# Patient Record
Sex: FEMALE | Race: WHITE | Employment: UNEMPLOYED | URBAN - METROPOLITAN AREA
[De-identification: names, ages, dates, MRNs, and addresses within clinical notes are randomized per-mention and may not be internally consistent; named-entity substitution may affect disease eponyms.]

---

## 2018-12-20 ENCOUNTER — HOSPITAL ENCOUNTER (OUTPATIENT)
Age: 43
Setting detail: SPECIMEN
Discharge: HOME | End: 2018-12-20
Payer: COMMERCIAL

## 2018-12-24 LAB — DERMATOLOGY PATHOLOGY REPORT: NORMAL

## 2019-11-13 ENCOUNTER — HOSPITAL ENCOUNTER (OUTPATIENT)
Dept: PHYSICAL THERAPY | Facility: CLINIC | Age: 44
Setting detail: THERAPIES SERIES
Discharge: HOME | End: 2019-11-13
Payer: COMMERCIAL

## 2019-11-13 PROCEDURE — 97110 THERAPEUTIC EXERCISES: CPT

## 2019-11-13 PROCEDURE — 97162 PT EVAL MOD COMPLEX 30 MIN: CPT

## 2021-01-16 ENCOUNTER — APPOINTMENT (OUTPATIENT)
Dept: GENERAL RADIOLOGY | Age: 46
DRG: 177 | End: 2021-01-16
Payer: COMMERCIAL

## 2021-01-16 ENCOUNTER — HOSPITAL ENCOUNTER (INPATIENT)
Age: 46
LOS: 1 days | Discharge: HOME | DRG: 177 | End: 2021-01-17
Attending: EMERGENCY MEDICINE | Admitting: HOSPITALIST
Payer: COMMERCIAL

## 2021-01-16 ENCOUNTER — APPOINTMENT (OUTPATIENT)
Dept: CT IMAGING | Age: 46
DRG: 177 | End: 2021-01-16
Payer: COMMERCIAL

## 2021-01-16 DIAGNOSIS — U07.1 COVID-19: Primary | ICD-10-CM

## 2021-01-16 DIAGNOSIS — R00.0 TACHYCARDIA: ICD-10-CM

## 2021-01-16 LAB
ABSOLUTE EOS #: 0 K/UL (ref 0–0.44)
ABSOLUTE IMMATURE GRANULOCYTE: 0.06 K/UL (ref 0–0.3)
ABSOLUTE LYMPH #: 0.58 K/UL (ref 1.1–3.7)
ABSOLUTE MONO #: 0.13 K/UL (ref 0.1–1.2)
ALBUMIN SERPL-MCNC: 3.8 G/DL (ref 3.5–5.2)
ALBUMIN/GLOBULIN RATIO: ABNORMAL (ref 1–2.5)
ALP BLD-CCNC: 84 U/L (ref 35–104)
ALT SERPL-CCNC: 35 U/L (ref 5–33)
ANION GAP SERPL CALCULATED.3IONS-SCNC: 14 MMOL/L (ref 9–17)
AST SERPL-CCNC: 42 U/L
BASOPHILS # BLD: 1 % (ref 0–2)
BASOPHILS ABSOLUTE: 0.06 K/UL (ref 0–0.2)
BILIRUB SERPL-MCNC: 0.21 MG/DL (ref 0.3–1.2)
BNP INTERPRETATION: NORMAL
BUN BLDV-MCNC: 14 MG/DL (ref 6–20)
BUN/CREAT BLD: 18 (ref 9–20)
C-REACTIVE PROTEIN: 39.2 MG/L (ref 0–5)
CALCIUM SERPL-MCNC: 8.8 MG/DL (ref 8.6–10.4)
CHLORIDE BLD-SCNC: 106 MMOL/L (ref 98–107)
CO2: 14 MMOL/L (ref 20–31)
CREAT SERPL-MCNC: 0.76 MG/DL (ref 0.5–0.9)
D-DIMER QUANTITATIVE: 2.07 MG/L FEU (ref 0–0.59)
DIFFERENTIAL TYPE: ABNORMAL
EKG ATRIAL RATE: 150 BPM
EKG P AXIS: 10 DEGREES
EKG P-R INTERVAL: 114 MS
EKG Q-T INTERVAL: 276 MS
EKG QRS DURATION: 90 MS
EKG QTC CALCULATION (BAZETT): 436 MS
EKG R AXIS: -127 DEGREES
EKG T AXIS: 12 DEGREES
EKG VENTRICULAR RATE: 150 BPM
EOSINOPHILS RELATIVE PERCENT: 0 % (ref 1–4)
FERRITIN: 175 UG/L (ref 13–150)
FIBRINOGEN: 461 MG/DL (ref 179–518)
GFR AFRICAN AMERICAN: >60 ML/MIN
GFR NON-AFRICAN AMERICAN: >60 ML/MIN
GFR SERPL CREATININE-BSD FRML MDRD: ABNORMAL ML/MIN/{1.73_M2}
GFR SERPL CREATININE-BSD FRML MDRD: ABNORMAL ML/MIN/{1.73_M2}
GLUCOSE BLD-MCNC: 169 MG/DL (ref 70–99)
HCT VFR BLD CALC: 41.2 % (ref 36.3–47.1)
HEMOGLOBIN: 13.3 G/DL (ref 11.9–15.1)
IMMATURE GRANULOCYTES: 1 %
LACTATE DEHYDROGENASE: 251 U/L (ref 135–214)
LYMPHOCYTES # BLD: 9 % (ref 24–43)
MAGNESIUM: 2.1 MG/DL (ref 1.6–2.6)
MCH RBC QN AUTO: 30.4 PG (ref 25.2–33.5)
MCHC RBC AUTO-ENTMCNC: 32.3 G/DL (ref 28.4–34.8)
MCV RBC AUTO: 94.1 FL (ref 82.6–102.9)
MONOCYTES # BLD: 2 % (ref 3–12)
NRBC AUTOMATED: 0 PER 100 WBC
PDW BLD-RTO: 12.3 % (ref 11.8–14.4)
PLATELET # BLD: 178 K/UL (ref 138–453)
PLATELET ESTIMATE: ABNORMAL
PMV BLD AUTO: 9.7 FL (ref 8.1–13.5)
POTASSIUM SERPL-SCNC: 3.7 MMOL/L (ref 3.7–5.3)
PRO-BNP: 29 PG/ML
RBC # BLD: 4.38 M/UL (ref 3.95–5.11)
RBC # BLD: ABNORMAL 10*6/UL
SARS-COV-2, RAPID: DETECTED
SARS-COV-2: ABNORMAL
SARS-COV-2: ABNORMAL
SEG NEUTROPHILS: 87 % (ref 36–65)
SEGMENTED NEUTROPHILS ABSOLUTE COUNT: 5.57 K/UL (ref 1.5–8.1)
SODIUM BLD-SCNC: 134 MMOL/L (ref 135–144)
SOURCE: ABNORMAL
TOTAL PROTEIN: 6.7 G/DL (ref 6.4–8.3)
TROPONIN INTERP: NORMAL
TROPONIN T: NORMAL NG/ML
TROPONIN, HIGH SENSITIVITY: 10 NG/L (ref 0–14)
TROPONIN, HIGH SENSITIVITY: 10 NG/L (ref 0–14)
TROPONIN, HIGH SENSITIVITY: 14 NG/L (ref 0–14)
WBC # BLD: 6.4 K/UL (ref 3.5–11.3)
WBC # BLD: ABNORMAL 10*3/UL

## 2021-01-16 PROCEDURE — 94761 N-INVAS EAR/PLS OXIMETRY MLT: CPT

## 2021-01-16 PROCEDURE — 84484 ASSAY OF TROPONIN QUANT: CPT

## 2021-01-16 PROCEDURE — 86140 C-REACTIVE PROTEIN: CPT

## 2021-01-16 PROCEDURE — 6360000004 HC RX CONTRAST MEDICATION: Performed by: EMERGENCY MEDICINE

## 2021-01-16 PROCEDURE — 96375 TX/PRO/DX INJ NEW DRUG ADDON: CPT

## 2021-01-16 PROCEDURE — 96374 THER/PROPH/DIAG INJ IV PUSH: CPT

## 2021-01-16 PROCEDURE — 85379 FIBRIN DEGRADATION QUANT: CPT

## 2021-01-16 PROCEDURE — 6360000002 HC RX W HCPCS: Performed by: EMERGENCY MEDICINE

## 2021-01-16 PROCEDURE — 71045 X-RAY EXAM CHEST 1 VIEW: CPT

## 2021-01-16 PROCEDURE — 2580000003 HC RX 258: Performed by: HOSPITALIST

## 2021-01-16 PROCEDURE — 93005 ELECTROCARDIOGRAM TRACING: CPT | Performed by: EMERGENCY MEDICINE

## 2021-01-16 PROCEDURE — 85025 COMPLETE CBC W/AUTO DIFF WBC: CPT

## 2021-01-16 PROCEDURE — 93010 ELECTROCARDIOGRAM REPORT: CPT | Performed by: INTERNAL MEDICINE

## 2021-01-16 PROCEDURE — 96361 HYDRATE IV INFUSION ADD-ON: CPT

## 2021-01-16 PROCEDURE — 6370000000 HC RX 637 (ALT 250 FOR IP): Performed by: HOSPITALIST

## 2021-01-16 PROCEDURE — 2500000003 HC RX 250 WO HCPCS: Performed by: EMERGENCY MEDICINE

## 2021-01-16 PROCEDURE — 83735 ASSAY OF MAGNESIUM: CPT

## 2021-01-16 PROCEDURE — 83615 LACTATE (LD) (LDH) ENZYME: CPT

## 2021-01-16 PROCEDURE — 80053 COMPREHEN METABOLIC PANEL: CPT

## 2021-01-16 PROCEDURE — 71260 CT THORAX DX C+: CPT

## 2021-01-16 PROCEDURE — 82728 ASSAY OF FERRITIN: CPT

## 2021-01-16 PROCEDURE — 99254 IP/OBS CNSLTJ NEW/EST MOD 60: CPT | Performed by: INTERNAL MEDICINE

## 2021-01-16 PROCEDURE — 36415 COLL VENOUS BLD VENIPUNCTURE: CPT

## 2021-01-16 PROCEDURE — 6360000002 HC RX W HCPCS: Performed by: HOSPITALIST

## 2021-01-16 PROCEDURE — 85384 FIBRINOGEN ACTIVITY: CPT

## 2021-01-16 PROCEDURE — 99284 EMERGENCY DEPT VISIT MOD MDM: CPT

## 2021-01-16 PROCEDURE — 2060000000 HC ICU INTERMEDIATE R&B

## 2021-01-16 PROCEDURE — 2580000003 HC RX 258: Performed by: EMERGENCY MEDICINE

## 2021-01-16 PROCEDURE — 83880 ASSAY OF NATRIURETIC PEPTIDE: CPT

## 2021-01-16 PROCEDURE — U0002 COVID-19 LAB TEST NON-CDC: HCPCS

## 2021-01-16 RX ORDER — DEXAMETHASONE 4 MG/1
6 TABLET ORAL DAILY
Status: DISCONTINUED | OUTPATIENT
Start: 2021-01-16 | End: 2021-01-17 | Stop reason: HOSPADM

## 2021-01-16 RX ORDER — MORPHINE SULFATE 15 MG/1
15 TABLET, FILM COATED, EXTENDED RELEASE ORAL 3 TIMES DAILY
COMMUNITY

## 2021-01-16 RX ORDER — QUETIAPINE FUMARATE 50 MG/1
50 TABLET, FILM COATED ORAL EVERY MORNING
COMMUNITY

## 2021-01-16 RX ORDER — ACETAMINOPHEN 325 MG/1
650 TABLET ORAL EVERY 4 HOURS PRN
Status: DISCONTINUED | OUTPATIENT
Start: 2021-01-16 | End: 2021-01-17 | Stop reason: HOSPADM

## 2021-01-16 RX ORDER — 0.9 % SODIUM CHLORIDE 0.9 %
80 INTRAVENOUS SOLUTION INTRAVENOUS ONCE
Status: COMPLETED | OUTPATIENT
Start: 2021-01-16 | End: 2021-01-16

## 2021-01-16 RX ORDER — TRAMADOL HYDROCHLORIDE 50 MG/1
50-100 TABLET ORAL 2 TIMES DAILY PRN
COMMUNITY

## 2021-01-16 RX ORDER — POTASSIUM CHLORIDE 7.45 MG/ML
10 INJECTION INTRAVENOUS PRN
Status: DISCONTINUED | OUTPATIENT
Start: 2021-01-16 | End: 2021-01-17 | Stop reason: HOSPADM

## 2021-01-16 RX ORDER — SODIUM CHLORIDE 0.9 % (FLUSH) 0.9 %
10 SYRINGE (ML) INJECTION PRN
Status: DISCONTINUED | OUTPATIENT
Start: 2021-01-16 | End: 2021-01-17 | Stop reason: HOSPADM

## 2021-01-16 RX ORDER — MAGNESIUM SULFATE 1 G/100ML
1 INJECTION INTRAVENOUS PRN
Status: DISCONTINUED | OUTPATIENT
Start: 2021-01-16 | End: 2021-01-17 | Stop reason: HOSPADM

## 2021-01-16 RX ORDER — MELOXICAM 15 MG/1
15 TABLET ORAL DAILY
COMMUNITY

## 2021-01-16 RX ORDER — QUETIAPINE FUMARATE 25 MG/1
50 TABLET, FILM COATED ORAL EVERY MORNING
Status: DISCONTINUED | OUTPATIENT
Start: 2021-01-17 | End: 2021-01-17 | Stop reason: HOSPADM

## 2021-01-16 RX ORDER — SODIUM CHLORIDE 0.9 % (FLUSH) 0.9 %
10 SYRINGE (ML) INJECTION EVERY 12 HOURS SCHEDULED
Status: DISCONTINUED | OUTPATIENT
Start: 2021-01-16 | End: 2021-01-17 | Stop reason: HOSPADM

## 2021-01-16 RX ORDER — GABAPENTIN 300 MG/1
300 CAPSULE ORAL 2 TIMES DAILY
COMMUNITY

## 2021-01-16 RX ORDER — DEXAMETHASONE SODIUM PHOSPHATE 10 MG/ML
6 INJECTION, SOLUTION INTRAMUSCULAR; INTRAVENOUS ONCE
Status: COMPLETED | OUTPATIENT
Start: 2021-01-16 | End: 2021-01-16

## 2021-01-16 RX ORDER — ROSUVASTATIN CALCIUM 5 MG/1
5 TABLET, COATED ORAL DAILY
COMMUNITY

## 2021-01-16 RX ORDER — POTASSIUM CHLORIDE 20 MEQ/1
40 TABLET, EXTENDED RELEASE ORAL PRN
Status: DISCONTINUED | OUTPATIENT
Start: 2021-01-16 | End: 2021-01-17 | Stop reason: HOSPADM

## 2021-01-16 RX ORDER — LABETALOL HYDROCHLORIDE 5 MG/ML
10 INJECTION, SOLUTION INTRAVENOUS ONCE
Status: COMPLETED | OUTPATIENT
Start: 2021-01-16 | End: 2021-01-16

## 2021-01-16 RX ORDER — FAMOTIDINE 40 MG/1
40 TABLET, FILM COATED ORAL NIGHTLY
COMMUNITY

## 2021-01-16 RX ORDER — QUETIAPINE FUMARATE 200 MG/1
400 TABLET, FILM COATED ORAL NIGHTLY
Status: DISCONTINUED | OUTPATIENT
Start: 2021-01-16 | End: 2021-01-17 | Stop reason: HOSPADM

## 2021-01-16 RX ORDER — GABAPENTIN 300 MG/1
600 CAPSULE ORAL NIGHTLY
COMMUNITY

## 2021-01-16 RX ORDER — GABAPENTIN 300 MG/1
300 CAPSULE ORAL 4 TIMES DAILY PRN
Status: DISCONTINUED | OUTPATIENT
Start: 2021-01-16 | End: 2021-01-17 | Stop reason: HOSPADM

## 2021-01-16 RX ORDER — LEVOTHYROXINE SODIUM 0.03 MG/1
25 TABLET ORAL
COMMUNITY

## 2021-01-16 RX ORDER — ALBUTEROL SULFATE 90 UG/1
2 AEROSOL, METERED RESPIRATORY (INHALATION) EVERY 6 HOURS PRN
Status: DISCONTINUED | OUTPATIENT
Start: 2021-01-16 | End: 2021-01-17 | Stop reason: HOSPADM

## 2021-01-16 RX ORDER — 0.9 % SODIUM CHLORIDE 0.9 %
1000 INTRAVENOUS SOLUTION INTRAVENOUS ONCE
Status: COMPLETED | OUTPATIENT
Start: 2021-01-16 | End: 2021-01-16

## 2021-01-16 RX ORDER — ATOMOXETINE 60 MG/1
60 CAPSULE ORAL DAILY
COMMUNITY

## 2021-01-16 RX ORDER — TOPIRAMATE 25 MG/1
25-50 TABLET ORAL SEE ADMIN INSTRUCTIONS
COMMUNITY
Start: 2021-01-03

## 2021-01-16 RX ORDER — BUSPIRONE HYDROCHLORIDE 7.5 MG/1
7.5 TABLET ORAL 2 TIMES DAILY
COMMUNITY

## 2021-01-16 RX ORDER — MORPHINE SULFATE 15 MG/1
15 TABLET, FILM COATED, EXTENDED RELEASE ORAL 3 TIMES DAILY
Status: DISCONTINUED | OUTPATIENT
Start: 2021-01-16 | End: 2021-01-17 | Stop reason: HOSPADM

## 2021-01-16 RX ADMIN — IOPAMIDOL 75 ML: 755 INJECTION, SOLUTION INTRAVENOUS at 06:13

## 2021-01-16 RX ADMIN — ENOXAPARIN SODIUM 30 MG: 30 INJECTION SUBCUTANEOUS at 15:06

## 2021-01-16 RX ADMIN — GABAPENTIN 300 MG: 300 CAPSULE ORAL at 17:27

## 2021-01-16 RX ADMIN — QUETIAPINE FUMARATE 400 MG: 200 TABLET ORAL at 20:17

## 2021-01-16 RX ADMIN — Medication 10 ML: at 20:18

## 2021-01-16 RX ADMIN — DEXAMETHASONE 6 MG: 4 TABLET ORAL at 15:06

## 2021-01-16 RX ADMIN — Medication 10 ML: at 06:13

## 2021-01-16 RX ADMIN — MORPHINE SULFATE 15 MG: 15 TABLET, FILM COATED, EXTENDED RELEASE ORAL at 20:17

## 2021-01-16 RX ADMIN — LABETALOL HYDROCHLORIDE 10 MG: 5 INJECTION INTRAVENOUS at 04:40

## 2021-01-16 RX ADMIN — MORPHINE SULFATE 15 MG: 15 TABLET, FILM COATED, EXTENDED RELEASE ORAL at 15:09

## 2021-01-16 RX ADMIN — SODIUM CHLORIDE 80 ML: 9 INJECTION, SOLUTION INTRAVENOUS at 06:13

## 2021-01-16 RX ADMIN — DEXAMETHASONE SODIUM PHOSPHATE 6 MG: 10 INJECTION, SOLUTION INTRAMUSCULAR; INTRAVENOUS at 05:50

## 2021-01-16 RX ADMIN — ENOXAPARIN SODIUM 30 MG: 30 INJECTION SUBCUTANEOUS at 20:17

## 2021-01-16 RX ADMIN — SODIUM CHLORIDE 1000 ML: 9 INJECTION, SOLUTION INTRAVENOUS at 04:41

## 2021-01-16 ASSESSMENT — PAIN SCALES - GENERAL
PAINLEVEL_OUTOF10: 8
PAINLEVEL_OUTOF10: 0

## 2021-01-16 NOTE — H&P
History & Physical  [facility]    Adult Hospitalist      Name: Ginny Travis  MRN: [id number]     Acct: [de-identified]  Room: [facility]2/1012-01    Admit Date: 1/16/2021  2:39 AM  PCP: Dyana Comer MD    Primary Problem  Active Problems:    COVID-19  Resolved Problems:    * No resolved hospital problems. *        Assesment:     · COVID-19 infection  · Viral pneumonia secondary to above  · Acute respiratory insufficiency  · Elevated D-dimer  · Hyponatremia  · Sinus tachycardia  · Depression with anxiety  · Chronic low back pain        Plan:     · Admitted to intermediate level  · O2 maintain oxygen saturation greater 92%  · Supportive care/prone positioning  · Decadron for now  · Monitor D-dimer  · Serial troponin  · Monitor sodium  · ID consult  · Pulmonology consult  · Continue to monitor/telemetry/CBC with differential daily/BMP daily  · DVT and GI prophylaxis. Continue medications as below      Scheduled Meds:   sodium chloride flush  10 mL Intravenous 2 times per day    morphine  15 mg Oral TID    QUEtiapine  300 mg Oral Daily     Continuous Infusions:    PRN Meds:      sodium chloride flush, 10 mL, PRN      sodium chloride flush, 10 mL, PRN      acetaminophen, 650 mg, Q4H PRN      gabapentin, 300 mg, 4x Daily PRN        Chief Complaint:     Chief Complaint   Patient presents with    Shortness of Breath         History of Present Illness:      Ginny Travis is a 50 y.o.  female who presents with Shortness of Breath    64-year-old lady with past medical history of chronic back pain, depression with anxiety presented to ER complaining of shortness of breath. Patient stated that her  was diagnosed of COVID-19 infection and has had symptoms since 1/11/2021. Patient stated that she developed difficulty breathing, lightheadedness, subjective fever, chills and lightheadedness. She denies any episode of passing out. Patient presented to ER due to above-mentioned symptoms.   On presentation she was desaturating and required 3 L O2 via nasal cannula. Patient also was tachycardic with heart rate in 130-140. On presentation WBC was 6.4. Sodium was 134. LDH was 251. Ferritin was 175. D-dimer was 2.07. Rapid Covid testing was positive. Chest x-ray was negative. CTA chest was negative for PE, shows bibasilar atelectasis and mild hiatal hernia. I have personally reviewed the past medical history, past surgical history, medications, social history, and family history, and summarized in the note. Review of Systems:     All 10 point system is reviewed and negative otherwise mentioned in HPI. Past Medical History:     Past Medical History:   Diagnosis Date    Back pain     Depression         Past Surgical History:     Past Surgical History:   Procedure Laterality Date    CHOLECYSTECTOMY          Medications Prior to Admission:       Prior to Admission medications    Medication Sig Start Date End Date Taking? Authorizing Provider   gabapentin (NEURONTIN) 300 MG capsule Take 300 mg by mouth 4 times daily as needed. Historical Provider, MD   morphine (MSIR) 15 MG tablet Take 15 mg by mouth 3 times daily. Historical Provider, MD   QUEtiapine (SEROQUEL) 300 MG tablet Take 300 mg by mouth daily    Historical Provider, MD        Allergies: Wellbutrin [bupropion]    Social History:     Tobacco:    reports that she has never smoked. She does not have any smokeless tobacco history on file. Alcohol:      reports no history of alcohol use. Drug Use:  reports no history of drug use. Family History:     History reviewed. No pertinent family history.       Physical Exam:     Vitals:  BP (!) 151/83   Pulse 103   Temp 98.2 °F (36.8 °C) (Oral)   Resp 16   Ht 5' 7\" (1.702 m)   Wt 251 lb 9.6 oz (114.1 kg)   SpO2 96%   BMI 39.41 kg/m²   Temp (24hrs), Av.1 °F (36.7 °C), Min:97.3 °F (36.3 °C), Max:98.5 °F (36.9 °C)          General appearance - alert, well appearing, and in no acute distress  Mental status - oriented to person, place, and time with normal affect  Head - normocephalic and atraumatic  Eyes - pupils equal and reactive, extraocular eye movements intact, conjunctiva clear  Ears - hearing appears to be intact  Nose - no drainage noted  Mouth - mucous membranes moist  Neck - supple, no carotid bruits, thyroid not palpable  Chest - clear to auscultation, normal effort  Heart - normal rate, regular rhythm, no murmur  Abdomen - soft, nontender, nondistended, bowel sounds present all four quadrants, no masses, hepatomegaly or splenomegaly  Neurological - normal speech, no focal findings or movement disorder noted, cranial nerves II through XII grossly intact  Extremities - peripheral pulses palpable, no pedal edema or calf pain with palpation  Skin - no gross lesions, rashes, or induration noted        Data:     Labs:    Hematology:  Recent Labs     01/16/21 0238 01/16/21  1040   WBC 6.4  --    RBC 4.38  --    HGB 13.3  --    HCT 41.2  --    MCV 94.1  --    MCH 30.4  --    MCHC 32.3  --    RDW 12.3  --      --    MPV 9.7  --    DDIMER  --  2.07*     Chemistry:  Recent Labs     01/16/21 0238   *   K 3.7      CO2 14*   GLUCOSE 169*   BUN 14   CREATININE 0.76   ANIONGAP 14   LABGLOM >60   GFRAA >60   CALCIUM 8.8   PROBNP 29   TROPHS 14     Recent Labs     01/16/21 0238 01/16/21  1040   PROT 6.7  --    LABALBU 3.8  --    AST 42*  --    ALT 35*  --    LDH  --  251*   ALKPHOS 84  --    BILITOT 0.21*  --        No results found for: INR, PROTIME    Lab Results   Component Value Date/Time    SPECIAL NOT REPORTED 04/17/2013 09:38 PM     Lab Results   Component Value Date/Time    CULTURE (A) 04/17/2013 09:38 PM     STAPHYLOCOCCUS AUREUS MODERATE GROWTH This isolate is methicillin susceptible.     CULTURE NORMAL SKIN FAIZA (A) 04/17/2013 09:38 PM    CULTURE  04/17/2013 09:38 PM     [employer] [address] [address] [phone]       No results found for: POCPH, PHART, PH, POCPCO2, VBW8SAR, PCO2, POCPO2, PO2ART, PO2, POCHCO3, VTM8CRU, HCO3, NBEA, PBEA, BEART, BE, THGBART, THB, ICJ1LSC, KYSN7XBF, I5CLMTYO, O2SAT, FIO2    Radiology:    Xr Chest Portable    Result Date: 1/16/2021  No acute cardiopulmonary process. Ct Chest Pulmonary Embolism W Contrast    Result Date: 1/16/2021  Bibasilar atelectasis. No acute or chronic pulmonary embolism. Mild hiatal hernia. All radiological studies reviewed                Code Status:  Full Code    Electronically signed by Callie Stephens MD on 1/16/2021 at 1:52 PM     Copy sent to Dr. Mesfin Richey MD    This note was created with the assistance of a speech-recognition program.  Although the intention is to generate a document that actually reflects the content of the visit, no guarantees can be provided that every mistake has been identified and corrected by editing. Note was updated later by me after  physical examination and  completion of the assessment.

## 2021-01-16 NOTE — PLAN OF CARE
Problem: Airway Clearance - Ineffective  Goal: Achieve or maintain patent airway  Outcome: Ongoing     Problem: Breathing Pattern - Ineffective  Goal: Ability to achieve and maintain a regular respiratory rate  Outcome: Ongoing     Problem: Infection:  Goal: Will remain free from infection  Description: Will remain free from infection  Outcome: Ongoing     Problem: Safety:  Goal: Free from accidental physical injury  Description: Free from accidental physical injury  Outcome: Ongoing

## 2021-01-16 NOTE — PROGRESS NOTES
Pt admitted to room 1012. Navigator started, orders released. VS taken, telemetry applied, pulse ox on. Pt oriented to room, call light within reach.

## 2021-01-16 NOTE — CONSULTS
Pulmonary Medicine and Critical Care Consult    Patient - Marjan Meza   MRN -  [id number]   [facility] - [[facility]   - 1975      Date of Admission -  2021  2:39 AM  Date of evaluation -  2021  Room - [address] [place], [place] [address]-   Deya Aguilar MD Primary Care Physician - Breana Aponte MD     Reason for Consult      COVID-19  Assessment   · Acute respiratory insufficiency  · COVID-19/left basilar pneumonia/atelectasis  · Asthma  · Ex-smoker  · Tachycardia/increased D-dimer with negative CTA    Recommendations   · Incentive spirometer every hour awake  · No indication for Decadron or remdesivir; Monitor O2 sats; will initiate treatment of hypoxia  · IV fluids/monitor heart rate and troponins  · Discussed with RN  · Lower extremity venous Dopplers  · Discharge planning per primary  · DVT prophylaxis    Problem List      Patient Active Problem List   Diagnosis    COVID-19       HPI     Marjan Meza is 50 y.o.,  female, previous medical history of asthma depression and back pain presented to the hospital for fevers and shortness of breath of around 4 to 5 days duration. She had minimal dry cough. Her  tested positive for COVID-19 since 10 days. She also reported having near syncope/dizziness. No diarrhea. She was tachycardic on presentation was placed on 4 L oxygen with no documentation of hypoxia. She was tachycardic. When she arrived to the floor she had good oxygen saturation on room air. Heart rate improved at 107/min. She is not hypotensive. She has quit smoking 7 years ago. She has no abdominal pain. No nausea vomiting.   She has been applying appealing cream to her neck to improve wrinkles    PMHx   Past Medical History      Diagnosis Date    Back pain     Depression       Past Surgical History        Procedure Laterality Date    CHOLECYSTECTOMY         Meds    Current Medications    sodium chloride flush  10 mL Intravenous 2 times Marital status:      Spouse name: Not on file    Number of children: Not on file    Years of education: Not on file    Highest education level: Not on file   Occupational History    Not on file   Social Needs    Financial resource strain: Not on file    Food insecurity     Worry: Not on file     Inability: Not on file    Transportation needs     Medical: Not on file     Non-medical: Not on file   Tobacco Use    Smoking status: Never Smoker   Substance and Sexual Activity    Alcohol use: No    Drug use: No    Sexual activity: Not on file   Lifestyle    Physical activity     Days per week: Not on file     Minutes per session: Not on file    Stress: Not on file   Relationships    Social connections     Talks on phone: Not on file     Gets together: Not on file     Attends Spiritism service: Not on file     Active member of club or organization: Not on file     Attends meetings of clubs or organizations: Not on file     Relationship status: Not on file    Intimate partner violence     Fear of current or ex partner: Not on file     Emotionally abused: Not on file     Physically abused: Not on file     Forced sexual activity: Not on file   Other Topics Concern    Not on file   Social History Narrative    Not on file     Family History    History reviewed. No pertinent family history.   ROS - 11 systems   General fever chills  HEENT Denies any diplopia, tinnitus or vertigo  Resp {no wheezing as above  Cardiac Denies any chest pain, palpitations, claudication or edema  GI Denies any melena, hematochezia, hematemesis or pyrosis   Denies any frequency, urgency, hesitancy or incontinence  Heme Denies bruising or bleeding easily  Endocrine Denies any history of diabetes or thyroid disease  Neuro Denies any focal motor deficits  Psychiatric Denies anxiety, depression, suicidal ideation  Skin itching, open sores rash over the neck    Vitals     height is 5' 7\" (1.702 m) and weight is 251 lb 9.6 oz (114.1 kg). Her oral temperature is 97.9 °F (36.6 °C). Her blood pressure is 149/79 (abnormal) and her pulse is 103. Her respiration is 16 and oxygen saturation is 98%. Body mass index is 39.41 kg/m². I/O        Intake/Output Summary (Last 24 hours) at 1/16/2021 1534  Last data filed at 1/16/2021 1245  Gross per 24 hour   Intake 240 ml   Output --   Net 240 ml     I/O last 3 completed shifts: In: 240 [P.O.:240]  Out: -    Patient Vitals for the past 96 hrs (Last 3 readings):   Weight   01/16/21 0642 251 lb 9.6 oz (114.1 kg)   01/16/21 0242 245 lb (111.1 kg)     Exam   General Appearance  Awake, alert, oriented, in no acute distress  HEENT - Head is normocephalic, atraumatic. Pupil reactive to light  Neck - Supple, symmetrical, trachea midline and Soft, noted rash over neck/discoloration  Lungs - {clear to auscultation no wheezing  Cardiovascular - Heart sounds are normal.  Regular rhythm normal rate without murmur, gallop or rub. Abdomen - Soft, nontender, nondistended, no masses or organomegaly  Neurologic - CN II-XII are grossly intact.  There are no focal motor deficits  Skin - No bruising or bleeding  Extremities - No cyanosis, clubbing or edema    Labs  - Old records and notes have been reviewed in [facility]   CBC     Lab Results   Component Value Date    WBC 6.4 01/16/2021    RBC 4.38 01/16/2021    HGB 13.3 01/16/2021    HCT 41.2 01/16/2021     01/16/2021    MCV 94.1 01/16/2021    MCH 30.4 01/16/2021    MCHC 32.3 01/16/2021    RDW 12.3 01/16/2021    LYMPHOPCT 9 01/16/2021    MONOPCT 2 01/16/2021    BASOPCT 1 01/16/2021    MONOSABS 0.13 01/16/2021    LYMPHSABS 0.58 01/16/2021    EOSABS 0.00 01/16/2021    BASOSABS 0.06 01/16/2021    DIFFTYPE NOT REPORTED 01/16/2021     BMP   Lab Results   Component Value Date     01/16/2021    K 3.7 01/16/2021     01/16/2021    CO2 14 01/16/2021    BUN 14 01/16/2021    CREATININE 0.76 01/16/2021    GLUCOSE 169 01/16/2021    CALCIUM 8.8 01/16/2021    MG 2.1 01/16/2021     LFTS  Lab Results   Component Value Date    ALKPHOS 84 01/16/2021    ALT 35 01/16/2021    AST 42 01/16/2021    PROT 6.7 01/16/2021    BILITOT 0.21 01/16/2021    LABALBU 3.8 01/16/2021       Radiology    CXR 1/16  No acute cardiopulmonary process    CTA chest          Bibasilar atelectasis. No acute or chronic pulmonary embolism. Mild hiatal hernia.  (See actual reports for details)    \"Thank you for asking us to see this patient\"    Case discussed with nurse and patient/family. Questions and concerns addressed.     Electronically signed by     Stan Crespo MD on 1/16/2021 at 3:34 PM

## 2021-01-16 NOTE — PROGRESS NOTES
Transitions of Care Pharmacy Service   Medication Review    The patient's list of current home medications has been reviewed. Attempted to interview patient via telephone, but she did not answer the phone. Her med list was updated per [employer]; per the pharmacy all meds are current and were picked up within the last 30 days. Unable to determine use of additional OTC products at this time. Source(s) of information: [employer], OARRS      PROVIDER ACTION REQUESTED  Medications that need to be addressed by a physician/nurse practitioner:    Medication Action Requested        Meds added to list need review/reorder as appropriate         Please feel free to call me with any questions about this encounter. Thank you. Steva Ahumada, [facility]   Transitions of Care Pharmacy Service  Phone:  [phone]  Fax: [phone]      Electronically signed by Steva Ahumada, [facility] on 1/16/2021 at 5:12 PM           Medications Prior to Admission:   gabapentin (NEURONTIN) 300 MG capsule, Take 300 mg by mouth 2 times daily. Indications: 300mg AM, 300mg in afternoon, and 600mg at bedtime   QUEtiapine (SEROQUEL) 50 MG tablet, Take 50 mg by mouth every morning And 400mg PM  topiramate (TOPAMAX) 25 MG tablet, Take 25-50 mg by mouth See Admin Instructions 25mg daily x 2 weeks then 50mg daily after  gabapentin (NEURONTIN) 300 MG capsule, Take 600 mg by mouth nightly. Indications: 300mg AM, 300mg in afternoon, and 600mg at bedtime  levothyroxine (SYNTHROID) 25 MCG tablet, Take 25 mcg by mouth every morning (before breakfast)  rosuvastatin (CRESTOR) 5 MG tablet, Take 5 mg by mouth daily  traMADol (ULTRAM) 50 MG tablet, Take  mg by mouth 2 times daily as needed for Pain.   atomoxetine (STRATTERA) 60 MG capsule, Take 60 mg by mouth daily  famotidine (PEPCID) 40 MG tablet, Take 40 mg by mouth nightly  meloxicam (MOBIC) 15 MG tablet, Take 15 mg by mouth daily  fluticasone-salmeterol (ADVAIR) 100-50 MCG/DOSE diskus inhaler, Inhale 1 puff into the lungs every 12 hours  busPIRone (BUSPAR) 7.5 MG tablet, Take 7.5 mg by mouth 2 times daily  morphine (MS CONTIN) 15 MG extended release tablet, Take 15 mg by mouth 3 times daily.   QUEtiapine (SEROQUEL) 300 MG tablet, Take 400 mg by mouth nightly And 50mg in AM

## 2021-01-16 NOTE — ED PROVIDER NOTES
EMERGENCY DEPARTMENT ENCOUNTER    Pt Name: Karla Viera  MRN: [id number]  [place] 1975  Date of evaluation: 1/16/21  CHIEF COMPLAINT       Chief Complaint   Patient presents with    Shortness of Breath     HISTORY OF PRESENT ILLNESS   Patient is a 49-year-old female with PMH of depression who is brought in by EMS for shortness of breath and palpitations. Symptoms started earlier this evening. No fevers, chest pain, abdominal pain, nausea, vomiting, changes in urine or stool. Of note,  is positive for COVID-19. REVIEW OF SYSTEMS     Review of Systems   All other systems reviewed and are negative. PASTMEDICAL HISTORY     Past Medical History:   Diagnosis Date    Back pain     Depression      SURGICAL HISTORY       Past Surgical History:   Procedure Laterality Date    CHOLECYSTECTOMY       CURRENT MEDICATIONS       Previous Medications    MORPHINE (MSIR) 15 MG TABLET    Take 15 mg by mouth 2 times daily    PREGABALIN (LYRICA) 150 MG CAPSULE    Take 150 mg by mouth 2 times daily     QUETIAPINE (SEROQUEL) 300 MG TABLET    Take 300 mg by mouth daily     ALLERGIES     is allergic to wellbutrin [bupropion]. FAMILY HISTORY     has no family status information on file. SOCIAL HISTORY       Social History     Tobacco Use    Smoking status: Never Smoker   Substance Use Topics    Alcohol use: No    Drug use: No     PHYSICAL EXAM     INITIAL VITALS: /67   Pulse 108   Temp 98.4 °F (36.9 °C) (Oral)   Resp (!) 31   Ht 5' 7\" (1.702 m)   Wt 245 lb (111.1 kg)   SpO2 94%   BMI 38.37 kg/m²    Physical Exam  HENT:      Head: Normocephalic. Right Ear: External ear normal.      Left Ear: External ear normal.      Nose: Nose normal.   Eyes:      Conjunctiva/sclera: Conjunctivae normal.   Cardiovascular:      Rate and Rhythm: Tachycardia present. Pulmonary:      Effort: Pulmonary effort is normal.   Abdominal:      General: Abdomen is flat. Skin:     General: Skin is dry. Neurological:      Mental Status: She is alert. Mental status is at baseline. Psychiatric:         Mood and Affect: Mood normal.         Behavior: Behavior normal.      Comments:     Limited exam secondary to Covid-19 pandemic. MEDICAL DECISION MAKING:   The patient is tachycardic, afebrile, nontoxic-appearing. Physical exam unremarkable. Based on history and exam differential includes infectious process in lungs, COVID-19. Also consider PE.  ED plan for basic labs, rate control, rapid COVID-19, CTA rule out PE.         DIAGNOSTIC RESULTS   EKG:All EKG's are interpreted by the Emergency Department Physician who either signs or Co-signs this chart in the absence of a cardiologist.        RADIOLOGY:All plain film, CT, MRI, and formal ultrasound images (except ED bedside ultrasound) are read by the radiologist, see reports below, unless otherwisenoted in MDM or here. XR CHEST PORTABLE   Final Result   No acute cardiopulmonary process. CT CHEST PULMONARY EMBOLISM W CONTRAST    (Results Pending)     LABS: All lab results were reviewed by myself, and all abnormals are listed below. Labs Reviewed   COMPREHENSIVE METABOLIC PANEL W/ REFLEX TO MG FOR LOW K - Abnormal; Notable for the following components:       Result Value    Glucose 169 (*)     Sodium 134 (*)     CO2 14 (*)     ALT 35 (*)     AST 42 (*)     Total Bilirubin 0.21 (*)     All other components within normal limits   COVID-19 - Abnormal; Notable for the following components:    SARS-CoV-2, Rapid DETECTED (*)     All other components within normal limits   CBC WITH AUTO DIFFERENTIAL   TROPONIN   BRAIN NATRIURETIC PEPTIDE       EMERGENCY DEPARTMENTCOURSE:   Patient did well in the ED. Heart rate improved with labetalol 10 mg IV. Positive rapid COVID-19. Waiting for results of CTA PE.   Will admit to hospital for COVID-19, tachycardia.      -06:00 -nina case with Dr. Alex Padron, accepts patient for admission to hospital.  She request consultation with pulmonologist and ID. Vitals:    Vitals:    01/16/21 0401 01/16/21 0431 01/16/21 0456 01/16/21 0501   BP: 103/64 100/73 (!) 110/59 105/67   Pulse: 132 130 110 108   Resp: 28 25 27 (!) 31   Temp:       TempSrc:       SpO2:       Weight:       Height:           The patient was given the following medications while in the emergency department:  Orders Placed This Encounter   Medications    0.9 % sodium chloride bolus    labetalol (NORMODYNE;TRANDATE) injection 10 mg    dexamethasone (PF) (DECADRON) injection 6 mg    iopamidol (ISOVUE-370) 76 % injection 75 mL    sodium chloride flush 0.9 % injection 10 mL    0.9 % sodium chloride bolus     CONSULTS:  IP CONSULT TO INTERNAL MEDICINE  IP CONSULT TO PULMONOLOGY  IP CONSULT TO INFECTIOUS DISEASES    FINAL IMPRESSION      1. COVID-19    2. Tachycardia          DISPOSITION/PLAN   DISPOSITION Admitted 01/16/2021 05:51:46 AM      PATIENT REFERRED TO:  No follow-up provider specified.   DISCHARGE MEDICATIONS:  New Prescriptions    No medications on file     Jaymie Babin MD  Attending Emergency Physician                    Lani Jones MD  01/16/21 8330

## 2021-01-16 NOTE — CONSULTS
[facility]  Initial Consult Note  Date: 1/16/2021    Hospital day :0     Impression:   1. COVID-19 virus infection without evidence of pneumonia  2. Asthma  3. Chronic low back pain  4. Arthritis  5. History of tobacco abuse  6. Obesity    Recommendations   · While the patient is requiring supplemental oxygen there is no evidence of pneumonia by chest x-ray or CT imaging of the chest  · The patient therefore does not qualify for antiviral therapy with remdesivir  · It is not clear to me whether the steroids are indicated at this point in time. · I do not see a role for the convalescent plasma at this point in time  · I will order inflammatory markers and this hopefully may be able to help guide therapy    Chief complaint/reason for consultation:   COVID-19 virus infection    History of Present Illness:   Karla Viera is a 50y.o.-year-old female who was initially admitted on 1/16/2021. [facility] has a history of asthma, depression, chronic back pain related to arthritis. She reports that her  was diagnosed with COVID-19 infection and had symptoms since Monday. She acutely developed difficulty breathing, some lightheadedness/near syncopal episodes, feeling feverish and had some chills but did not have any significant cough or abdominal pain but did have some nausea and bouts of emesis. The patient reports that due to the shortness of breath and palpitations she decided to come into the emergency room for evaluation where she was noted to be tachycardic and rapid Covid testing was positive. She has been admitted and is currently on 3 L by nasal cannula. I was asked to evaluate and help with antibiotic choice. I have personally reviewed the past medical history, past surgical history, medications, social history, and family history, and I have updated the database accordingly.   Past Medical History:     Past Medical History:   Diagnosis Date    Back pain     Depression Past Surgical  History:     Past Surgical History:   Procedure Laterality Date    CHOLECYSTECTOMY       Medications:      sodium chloride flush  10 mL Intravenous 2 times per day     Social History:     Social History     Socioeconomic History    Marital status:      Spouse name: Not on file    Number of children: Not on file    Years of education: Not on file    Highest education level: Not on file   Occupational History    Not on file   Social Needs    Financial resource strain: Not on file    Food insecurity     Worry: Not on file     Inability: Not on file    Transportation needs     Medical: Not on file     Non-medical: Not on file   Tobacco Use    Smoking status: Never Smoker   Substance and Sexual Activity    Alcohol use: No    Drug use: No    Sexual activity: Not on file   Lifestyle    Physical activity     Days per week: Not on file     Minutes per session: Not on file    Stress: Not on file   Relationships    Social connections     Talks on phone: Not on file     Gets together: Not on file     Attends Orthodoxy service: Not on file     Active member of club or organization: Not on file     Attends meetings of clubs or organizations: Not on file     Relationship status: Not on file    Intimate partner violence     Fear of current or ex partner: Not on file     Emotionally abused: Not on file     Physically abused: Not on file     Forced sexual activity: Not on file   Other Topics Concern    Not on file   Social History Narrative    Not on file     Family History:   History reviewed. No pertinent family history. Allergies: Wellbutrin [bupropion]     Review of Systems:   General: She recalls feeling feverish and having some chills  Eyes: No double vision or blurry vision. ENT: No sore throat or runny nose.   Cardiovascular: No chest pain but she has had some palpitations/lightheadedness  Lung: She just started having a cough here in the hospital, has had some shortness of breath/difficulty breathing  Abdomen: No abdominal pain but she has some nausea vomiting but no diarrhea  Genitourinary: No increased urinary frequency, or dysuria. Musculoskeletal: She has chronic low back pain  Hematologic: No bleeding or bruising. Neurologic: No headache, weakness, numbness, or tingling. Physical Examination :   /76   Pulse 101   Temp 97.3 °F (36.3 °C) (Oral)   Resp 16   Ht 5' 7\" (1.702 m)   Wt 251 lb 9.6 oz (114.1 kg)   SpO2 97%   BMI 39.41 kg/m²     Temperature Range: Temp: 97.3 °F (36.3 °C) Temp  Av.1 °F (36.7 °C)  Min: 97.3 °F (36.3 °C)  Max: 98.5 °F (36.9 °C)  General Appearance: Awake, alert, and in no apparent distress  Head: Normocephalic, without obvious abnormality, atraumatic  Eyes: Pupils equal, round, reactive, to light and accommodation; extraocular movements intact; sclera anicteric; conjunctivae pink  ENT: Oropharynx clear, without erythema, exudate, or thrush. Neck: Supple, without lymphadenopathy. Pulmonary/Chest: Clear to auscultation, without wheezes, rales, or rhonchi  Cardiovascular: Regular rate and rhythm without murmurs, rubs, or gallops. Abdomen: soft, non-tender, non-distended, normal bowel sounds, no masses or organomegaly and obese abdomen  Extremities: No cyanosis, clubbing, edema, or effusions. Neurologic: No gross sensory or motor deficits.     Skin: There is some mild dermatitis in the lower neck mostly left-sided    Medical Decision Making:   I have independently reviewed/ordered the following labs:  CBC with Differential:   Recent Labs     21   WBC 6.4   HGB 13.3   HCT 41.2      LYMPHOPCT 9*   MONOPCT 2*     BMP:   Recent Labs     21   *   K 3.7      CO2 14*   BUN 14   CREATININE 0.76     Hepatic Function Panel:   Recent Labs     21   PROT 6.7   LABALBU 3.8   BILITOT 0.21*   ALKPHOS 84   ALT 35*   AST 42*       No results found for: PROCAL    No results found for: CRP  No results found for: FERRITIN  No results found for: FIBRINOGEN  No results found for: DDIMER  No results found for: LDH    No results found for: SEDRATE    Lab Results   Component Value Date    COVID19 DETECTED 01/16/2021     No results found for requested labs within last 30 days. Imaging Studies:   ONE XRAY VIEW OF THE CHEST 1/16/2021 3:24 am   FINDINGS:   No acute cardiopulmonary process. CTA OF THE CHEST 1/16/2021 6:02 am     FINDINGS:  Bibasilar atelectasis. No acute or chronic pulmonary embolism. Mild hiatal hernia. Cultures:   None      Thank you for allowing us to participate in the care of this patient. Please call with questions. Electronically signed by Saintclair Ard, MD on 1/16/2021 at 9:53 AM      [facility]  [address] [facility] messaging  OFFICE: [phone]      This note is created with the assistance of a speech recognition program.  While intending to generate a document that actually reflects the content of the visit, the document can still have some errors including those of syntax and sound a like substitutions which may escape proof reading. In such instances, actual meaning can be extrapolated by contextual diversion.

## 2021-01-17 VITALS
TEMPERATURE: 97.7 F | DIASTOLIC BLOOD PRESSURE: 76 MMHG | OXYGEN SATURATION: 97 % | SYSTOLIC BLOOD PRESSURE: 126 MMHG | BODY MASS INDEX: 39.49 KG/M2 | WEIGHT: 251.6 LBS | RESPIRATION RATE: 18 BRPM | HEIGHT: 67 IN | HEART RATE: 90 BPM

## 2021-01-17 LAB
ABSOLUTE EOS #: <0.03 K/UL (ref 0–0.44)
ABSOLUTE IMMATURE GRANULOCYTE: 0.11 K/UL (ref 0–0.3)
ABSOLUTE LYMPH #: 1.3 K/UL (ref 1.1–3.7)
ABSOLUTE MONO #: 0.57 K/UL (ref 0.1–1.2)
ANION GAP SERPL CALCULATED.3IONS-SCNC: 11 MMOL/L (ref 9–17)
BASOPHILS # BLD: 0 % (ref 0–2)
BASOPHILS ABSOLUTE: <0.03 K/UL (ref 0–0.2)
BUN BLDV-MCNC: 14 MG/DL (ref 6–20)
BUN/CREAT BLD: 27 (ref 9–20)
CALCIUM SERPL-MCNC: 8.6 MG/DL (ref 8.6–10.4)
CHLORIDE BLD-SCNC: 106 MMOL/L (ref 98–107)
CO2: 20 MMOL/L (ref 20–31)
CREAT SERPL-MCNC: 0.52 MG/DL (ref 0.5–0.9)
D-DIMER QUANTITATIVE: 1.88 MG/L FEU (ref 0–0.59)
DIFFERENTIAL TYPE: ABNORMAL
EOSINOPHILS RELATIVE PERCENT: 0 % (ref 1–4)
GFR AFRICAN AMERICAN: >60 ML/MIN
GFR NON-AFRICAN AMERICAN: >60 ML/MIN
GFR SERPL CREATININE-BSD FRML MDRD: ABNORMAL ML/MIN/{1.73_M2}
GFR SERPL CREATININE-BSD FRML MDRD: ABNORMAL ML/MIN/{1.73_M2}
GLUCOSE BLD-MCNC: 176 MG/DL (ref 70–99)
HCT VFR BLD CALC: 38.6 % (ref 36.3–47.1)
HEMOGLOBIN: 12.5 G/DL (ref 11.9–15.1)
IMMATURE GRANULOCYTES: 1 %
LYMPHOCYTES # BLD: 9 % (ref 24–43)
MCH RBC QN AUTO: 30.3 PG (ref 25.2–33.5)
MCHC RBC AUTO-ENTMCNC: 32.4 G/DL (ref 28.4–34.8)
MCV RBC AUTO: 93.5 FL (ref 82.6–102.9)
MONOCYTES # BLD: 4 % (ref 3–12)
NRBC AUTOMATED: 0 PER 100 WBC
PDW BLD-RTO: 12.6 % (ref 11.8–14.4)
PLATELET # BLD: 205 K/UL (ref 138–453)
PLATELET ESTIMATE: ABNORMAL
PMV BLD AUTO: 9.3 FL (ref 8.1–13.5)
POTASSIUM SERPL-SCNC: 4 MMOL/L (ref 3.7–5.3)
RBC # BLD: 4.13 M/UL (ref 3.95–5.11)
RBC # BLD: ABNORMAL 10*6/UL
SEG NEUTROPHILS: 86 % (ref 36–65)
SEGMENTED NEUTROPHILS ABSOLUTE COUNT: 11.93 K/UL (ref 1.5–8.1)
SODIUM BLD-SCNC: 137 MMOL/L (ref 135–144)
TROPONIN INTERP: NORMAL
TROPONIN T: NORMAL NG/ML
TROPONIN, HIGH SENSITIVITY: 9 NG/L (ref 0–14)
WBC # BLD: 13.9 K/UL (ref 3.5–11.3)
WBC # BLD: ABNORMAL 10*3/UL

## 2021-01-17 PROCEDURE — 85025 COMPLETE CBC W/AUTO DIFF WBC: CPT

## 2021-01-17 PROCEDURE — 2580000003 HC RX 258: Performed by: HOSPITALIST

## 2021-01-17 PROCEDURE — 36415 COLL VENOUS BLD VENIPUNCTURE: CPT

## 2021-01-17 PROCEDURE — 80048 BASIC METABOLIC PNL TOTAL CA: CPT

## 2021-01-17 PROCEDURE — 6370000000 HC RX 637 (ALT 250 FOR IP): Performed by: HOSPITALIST

## 2021-01-17 PROCEDURE — 84484 ASSAY OF TROPONIN QUANT: CPT

## 2021-01-17 PROCEDURE — 85379 FIBRIN DEGRADATION QUANT: CPT

## 2021-01-17 PROCEDURE — 6360000002 HC RX W HCPCS: Performed by: HOSPITALIST

## 2021-01-17 RX ADMIN — QUETIAPINE FUMARATE 50 MG: 25 TABLET ORAL at 08:39

## 2021-01-17 RX ADMIN — MORPHINE SULFATE 15 MG: 15 TABLET, FILM COATED, EXTENDED RELEASE ORAL at 14:11

## 2021-01-17 RX ADMIN — MORPHINE SULFATE 15 MG: 15 TABLET, FILM COATED, EXTENDED RELEASE ORAL at 08:38

## 2021-01-17 RX ADMIN — DEXAMETHASONE 6 MG: 4 TABLET ORAL at 08:38

## 2021-01-17 RX ADMIN — Medication 10 ML: at 08:38

## 2021-01-17 RX ADMIN — ENOXAPARIN SODIUM 30 MG: 30 INJECTION SUBCUTANEOUS at 08:38

## 2021-01-17 RX ADMIN — GABAPENTIN 300 MG: 300 CAPSULE ORAL at 08:38

## 2021-01-17 ASSESSMENT — PAIN SCALES - GENERAL
PAINLEVEL_OUTOF10: 0
PAINLEVEL_OUTOF10: 3

## 2021-01-17 NOTE — DISCHARGE SUMMARY
[facility].,    Adult Hospitalist      Patient ID: Rebeca Oconnor  MRN: [id number]     [place]:  [de-identified]       Patient's PCP: James Amezcua MD    Admit Date: 1/16/2021     Discharge Date:   1/17/2021    Admitting Physician: Gray Duenas MD    Discharge Physician: Gray Duenas MD     CONSULTANTS: Patient Care Team:  James Amezcua MD as PCP - Tanya Howard MD as Consulting Physician (Family Medicine)    PROCEDURES PERFORMED:     Active Discharge Diagnoses:  · COVID-19 infection  · Viral pneumonia secondary to above  · Acute respiratory insufficiency  · Elevated D-dimer  · Hyponatremia  · Sinus tachycardia  · Depression with anxiety  · Chronic low back pain      Primary Problem  <principal problem not specified>    Hospital Course:     69-year-old lady with past medical history of chronic back pain, depression with anxiety presented to ER complaining of shortness of breath. Patient stated that her  was diagnosed of COVID-19 infection and has had symptoms since 1/11/2021. Patient stated that she developed difficulty breathing, lightheadedness, subjective fever, chills and lightheadedness. She denies any episode of passing out. Patient presented to ER due to above-mentioned symptoms. On presentation she was desaturating and required 3 L O2 via nasal cannula. Patient also was tachycardic with heart rate in 130-140. On presentation WBC was 6.4. Sodium was 134. LDH was 251. Ferritin was 175. D-dimer was 2.07. Rapid Covid testing was positive. Chest x-ray was negative. CTA chest was negative for PE, shows bibasilar atelectasis and mild hiatal hernia. Patient received Decadron. During hospital stay she was evaluated by pulmonology and ID. Patient was maintaining her oxygen saturation doing well. They do not feel she needs to be continued on Decadron. She is not a candidate of remdesivir. Patient continued to improve.   Decadron was discontinued. Patient was discharged with recommendation for close outpatient follow-up appointment with ID and pulmonology as needed. At the time of discharge patient was hemodynamically stable and asymptomatic. The plan was discussed in detail with patient who agreed with the plan and verbalized understanding . The patient was seen and examined on day of discharge and this discharge summary is in conjunction with any daily progress note from day of discharge. Hospital Data:    Labs:    Hematology:  Recent Labs     01/16/21 0238 01/16/21  1040 01/17/21  0618   WBC 6.4  --  13.9*   RBC 4.38  --  4.13   HGB 13.3  --  12.5   HCT 41.2  --  38.6   MCV 94.1  --  93.5   MCH 30.4  --  30.3   MCHC 32.3  --  32.4   RDW 12.3  --  12.6     --  205   MPV 9.7  --  9.3   CRP  --  39.2*  --    DDIMER  --  2.07* 1.88*     Chemistry:  Recent Labs     01/16/21 0238 01/16/21  1413 01/16/21  2159 01/17/21  0618   *  --   --  137   K 3.7  --   --  4.0     --   --  106   CO2 14*  --   --  20   GLUCOSE 169*  --   --  176*   BUN 14  --   --  14   CREATININE 0.76  --   --  0.52   MG  --  2.1  --   --    ANIONGAP 14  --   --  11   LABGLOM >60  --   --  >60   GFRAA >60  --   --  >60   CALCIUM 8.8  --   --  8.6   PROBNP 29  --   --   --    TROPHS 14 10 10 9     Recent Labs     01/16/21 0238 01/16/21  1040   PROT 6.7  --    LABALBU 3.8  --    AST 42*  --    ALT 35*  --    LDH  --  251*   ALKPHOS 84  --    BILITOT 0.21*  --      No results found for: INR, PROTIME  Lab Results   Component Value Date/Time    SPECIAL NOT REPORTED 04/17/2013 09:38 PM     Lab Results   Component Value Date/Time    CULTURE (A) 04/17/2013 09:38 PM     STAPHYLOCOCCUS AUREUS MODERATE GROWTH This isolate is methicillin susceptible.     CULTURE NORMAL SKIN FAIZA (A) 04/17/2013 09:38 PM    CULTURE  04/17/2013 09:38 PM     [facility] [address] [facility], [facility] 3 [phone]       No results found for: [address] , [place] [address], [address] [facility], [id number], PCO2, POCPO2, PO2ART, PO2, POCHCO3, [id number], HCO3, NBEA, PBEA, BEART, BE, THGBART, THB, [id number], [id number], [id number], O2SAT, FIO2    Radiology:    Xr Chest Portable    Result Date: 2021  No acute cardiopulmonary process. Ct Chest Pulmonary Embolism W Contrast    Result Date: 2021  Bibasilar atelectasis. No acute or chronic pulmonary embolism. Mild hiatal hernia. All radiological studies reviewed      Reviews of Symptoms:    A 10 point system is reviewed and  negative except described in hospital course    Physical Exam:    Vitals:  /76   Pulse 90   Temp 97.7 °F (36.5 °C) (Oral)   Resp 18   Ht 5' 7\" (1.702 m)   Wt 251 lb 9.6 oz (114.1 kg)   SpO2 97%   BMI 39.41 kg/m²   Temp (24hrs), Av.7 °F (36.5 °C), Min:97.3 °F (36.3 °C), Max:98.1 °F (36.7 °C)      General appearance - alert, well appearing, and in no acute distress  Mental status - oriented to person, place, and time with normal affect  Head - normocephalic and atraumatic  Eyes - pupils equal and reactive, extraocular eye movements intact, conjunctiva clear  Ears - hearing appears to be intact  Nose - no drainage noted  Mouth - mucous membranes moist  Neck - supple, no carotid bruits, thyroid not palpable  Chest - clear to auscultation, normal effort  Heart - normal rate, regular rhythm, no murmur  Abdomen - soft, nontender, nondistended, bowel sounds present all four quadrants, no masses, hepatomegaly or splenomegaly  Neurological - normal speech, no focal findings or movement disorder noted, cranial nerves II through XII grossly intact  Extremities - peripheral pulses palpable, no pedal edema or calf pain with palpation  Skin - no gross lesions, rashes, or induration noted      Consults:  IP CONSULT TO INTERNAL MEDICINE  IP CONSULT TO PULMONOLOGY  IP CONSULT TO INFECTIOUS DISEASES    Disposition: Home    Discharged Condition: Stable    Follow Up: No follow-up provider specified.     Lab Frequency Next Occurrence   Up with assistance PRN    D-Dimer Test DAILY    MDI Treatment EVERY 6 HOURS PRN    CBC Auto Differential DAILY    Basic Metabolic Panel DAILY          Diet: DIET GENERAL;    Discharge Medications:    [place]   Home Medication Instructions GOI:[id number]    Printed on:01/17/21 9559   Medication Information                      atomoxetine (STRATTERA) 60 MG capsule  Take 60 mg by mouth daily             busPIRone (BUSPAR) 7.5 MG tablet  Take 7.5 mg by mouth 2 times daily             famotidine (PEPCID) 40 MG tablet  Take 40 mg by mouth nightly             fluticasone-salmeterol (ADVAIR) 100-50 MCG/DOSE diskus inhaler  Inhale 1 puff into the lungs every 12 hours             gabapentin (NEURONTIN) 300 MG capsule  Take 300 mg by mouth 2 times daily. Indications: 300mg AM, 300mg in afternoon, and 600mg at bedtime              gabapentin (NEURONTIN) 300 MG capsule  Take 600 mg by mouth nightly. Indications: 300mg AM, 300mg in afternoon, and 600mg at bedtime             levothyroxine (SYNTHROID) 25 MCG tablet  Take 25 mcg by mouth every morning (before breakfast)             meloxicam (MOBIC) 15 MG tablet  Take 15 mg by mouth daily             morphine (MS CONTIN) 15 MG extended release tablet  Take 15 mg by mouth 3 times daily. QUEtiapine (SEROQUEL) 300 MG tablet  Take 400 mg by mouth nightly And 50mg in AM             QUEtiapine (SEROQUEL) 50 MG tablet  Take 50 mg by mouth every morning And 400mg PM             rosuvastatin (CRESTOR) 5 MG tablet  Take 5 mg by mouth daily             topiramate (TOPAMAX) 25 MG tablet  Take 25-50 mg by mouth See Admin Instructions 25mg daily x 2 weeks then 50mg daily after             traMADol (ULTRAM) 50 MG tablet  Take  mg by mouth 2 times daily as needed for Pain.                  Code Status:  Full Code    Time Spent on discharge is  31 minutes in patient examination, evaluation, counseling as well as medication reconciliation, prescriptions for required medications, discharge plan and follow up. Electronically signed by Orestes Cook MD on 1/17/2021 at 1:54 PM     Thank you Dr. Jose F Aguirre MD for the opportunity to be involved in this patient's care. This note was created with the assistance of a speech-recognition program.  Although the intention is to generate a document that actually reflects the content of the visit, no guarantees can be provided that every mistake has been identified and corrected by editing. Note was updated later by me after  physical examination and  completion of the assessment.

## 2021-01-17 NOTE — PROGRESS NOTES
Discharge paperwork reviewed with patient at bedside. Alert and oriented to person, place, time/situation. normal mood and affect. no apparent risk to self or others.

## 2021-01-18 ENCOUNTER — CARE COORDINATION (OUTPATIENT)
Dept: CASE MANAGEMENT | Age: 46
End: 2021-01-18

## 2021-01-19 ENCOUNTER — CARE COORDINATION (OUTPATIENT)
Dept: CASE MANAGEMENT | Age: 46
End: 2021-01-19

## 2021-01-19 NOTE — CARE COORDINATION
[facility] Transitions Initial Follow Up Call- 2nd attempt COVID risk follow up call       Call within 2 business days of discharge: Yes    Patient: Karla Viera Patient : 1975   MRN: [id number]  Reason for Admission: COVID-19    Discharge Date: 21 RARS: Readmission Risk Score: 9      Last Discharge [facility]       Complaint Diagnosis Description Type Department Provider    21 Shortness of Breath COVID- . .. ED to Hosp-Admission (Discharged) (ADMITTED) Irma Neumann MD; Eunice Young... Date/Time:  2021 2:02 PM  Attempted to reach patient by telephone. Call within 2 business days of discharge: Yes. 2nd attempt. Left HIPPA compliant message informing this will be final chikis. Encouraged call back if questions or concerns. Episode closed       Follow Up  No future appointments.     Jose Rafael Alba RN